# Patient Record
Sex: MALE | Race: OTHER | URBAN - METROPOLITAN AREA
[De-identification: names, ages, dates, MRNs, and addresses within clinical notes are randomized per-mention and may not be internally consistent; named-entity substitution may affect disease eponyms.]

---

## 2018-03-20 ENCOUNTER — IMPORTED ENCOUNTER (OUTPATIENT)
Dept: URBAN - METROPOLITAN AREA CLINIC 9 | Facility: CLINIC | Age: 73
End: 2018-03-20

## 2019-04-16 NOTE — PATIENT DISCUSSION
The patient was informed that with the Basic + option, they will most likely need prescription glasses at all focal points after surgery. The patient elects Basic + OD, goal of emmetropia.

## 2019-04-16 NOTE — PATIENT DISCUSSION
Patient educated that cataract surgery will not correct double vision and they will still require prism in glasses after surgery.

## 2019-04-24 NOTE — PATIENT DISCUSSION
The types of intraocular lenses were reviewed with the patient along with a discussion of their various strengths and weaknesses. Patient

## 2019-04-24 NOTE — PROCEDURE NOTE: SURGICAL
"<span style=""font-weight:bold;"">MR #:</span> [id number]<br /><br /><span style=""font-weight:bold;"">PREOPERATIVE DIAGNOSIS:</span> Cataract

## 2019-05-08 NOTE — PROCEDURE NOTE: SURGICAL
"<span style=""font-weight:bold;"">MR #:</span> [id number]<br /><br /><span style=""font-weight:bold;"">PREOPERATIVE DIAGNOSIS:</span> Cataract

## 2019-05-08 NOTE — PATIENT DISCUSSION
Cataract surgery has been performed in the first eye and activities of daily living are still impaired. The patient would like to proceed with cataract surgery in the second eye as scheduled. The patient elects Basic + OS, goal of emmetropia.

## 2019-05-16 NOTE — PATIENT DISCUSSION
1 WEEK PO: Patient is doing well post-operatively. The importance of post-op drop compliance was emphasized. Drop schedule reviewed with patient. Patient to call if any visual changes or concerns.

## 2019-07-15 NOTE — PATIENT DISCUSSION
PER KDS,  GOOD CANDIDATE FOR LASIK OS AND PRK OS.   [facility]

## 2019-07-15 NOTE — PATIENT DISCUSSION
"The patient is given the patient education document: ""[facility]""

## 2020-08-27 ENCOUNTER — IMPORTED ENCOUNTER (OUTPATIENT)
Dept: URBAN - METROPOLITAN AREA CLINIC 9 | Facility: CLINIC | Age: 75
End: 2020-08-27

## 2020-09-14 ENCOUNTER — IMPORTED ENCOUNTER (OUTPATIENT)
Dept: URBAN - METROPOLITAN AREA CLINIC 9 | Facility: CLINIC | Age: 75
End: 2020-09-14

## 2020-10-08 ENCOUNTER — IMPORTED ENCOUNTER (OUTPATIENT)
Dept: URBAN - METROPOLITAN AREA CLINIC 9 | Facility: CLINIC | Age: 75
End: 2020-10-08

## 2020-10-19 ENCOUNTER — IMPORTED ENCOUNTER (OUTPATIENT)
Dept: URBAN - METROPOLITAN AREA CLINIC 9 | Facility: CLINIC | Age: 75
End: 2020-10-19

## 2020-10-22 ENCOUNTER — IMPORTED ENCOUNTER (OUTPATIENT)
Dept: URBAN - METROPOLITAN AREA CLINIC 9 | Facility: CLINIC | Age: 75
End: 2020-10-22

## 2020-10-26 ENCOUNTER — IMPORTED ENCOUNTER (OUTPATIENT)
Dept: URBAN - METROPOLITAN AREA CLINIC 9 | Facility: CLINIC | Age: 75
End: 2020-10-26

## 2020-11-02 ENCOUNTER — IMPORTED ENCOUNTER (OUTPATIENT)
Dept: URBAN - METROPOLITAN AREA CLINIC 9 | Facility: CLINIC | Age: 75
End: 2020-11-02

## 2020-11-02 PROBLEM — Z96.1: Noted: 2020-11-02

## 2021-10-18 ASSESSMENT — VISUAL ACUITY
OD_SC: 20/25 SN
OS_SC: 20/100 SN
OD_CC: 20/20 SN
OS_SC: 20/400 SN
OD_SC: 20/400 SN
OD_SC: 20/25 - SN
OD_CC: 20/25 SN
OS_CC: 20/30 SN
OS_CC: 20/40 SN
OS_CC: 20/30 -2 SN
OD_CC: 20/20 SN
OD_CC: 20/20 - SN
OD_CC: 20/25 SN
OS_CC: 20/40 SN
OS_CC: 20/25 SN
OS_CC: 20/30 SN
OS_CC: 20/25 SN
OD_CC: 20/20 SN
OD_CC: 20/25 SN
OS_SC: 20/25 -2 SN
OS_CC: 20/40 SN
OS_SC: 20/40 -2 SN
OS_CC: 20/25 SN
OD_SC: 20/200 SN
OD_SC: 20/25 -2 SN
OD_CC: 20/25 SN
OS_SC: 20/30 SN

## 2021-10-18 ASSESSMENT — KERATOMETRY
OS_AXISANGLE2_DEGREES: 103
OD_AXISANGLE2_DEGREES: 101
OD_AXISANGLE_DEGREES: 11
OD_K1POWER_DIOPTERS: 42
OS_AXISANGLE_DEGREES: 13
OS_K1POWER_DIOPTERS: 42.5
OS_K2POWER_DIOPTERS: 42.5
OD_K2POWER_DIOPTERS: 40.75

## 2021-10-18 ASSESSMENT — TONOMETRY
OD_IOP_MMHG: 16
OD_IOP_MMHG: 10
OS_IOP_MMHG: 11
OS_IOP_MMHG: 11
OD_IOP_MMHG: 12
OD_IOP_MMHG: 12
OS_IOP_MMHG: 20
OD_IOP_MMHG: 13
OS_IOP_MMHG: 16
OS_IOP_MMHG: 15
OS_IOP_MMHG: 14
OD_IOP_MMHG: 15

## 2022-06-21 RX ORDER — OMEPRAZOLE 20 MG/1
1 CAPSULE, DELAYED RELEASE ORAL
COMMUNITY

## 2022-06-21 RX ORDER — NAPROXEN 250 MG/1
TABLET ORAL
COMMUNITY

## 2022-06-21 RX ORDER — ATORVASTATIN CALCIUM 40 MG/1
TABLET, FILM COATED ORAL
COMMUNITY

## 2022-06-21 RX ORDER — TAMSULOSIN HYDROCHLORIDE 0.4 MG/1
CAPSULE ORAL
COMMUNITY

## 2022-06-21 RX ORDER — SILDENAFIL 100 MG/1
TABLET, FILM COATED ORAL
COMMUNITY

## 2022-09-12 NOTE — PATIENT DISCUSSION
The patient feels that the cataract is significantly impacting daily activities and has elected cataract surgery. The risks, benefits, and alternatives to surgery were discussed. The patient elects to proceed with surgery. Cyclophosphamide Pregnancy And Lactation Text: This medication is Pregnancy Category D and it isn't considered safe during pregnancy. This medication is excreted in breast milk.

## 2022-09-14 ENCOUNTER — ESTABLISHED PATIENT (OUTPATIENT)
Dept: URBAN - METROPOLITAN AREA CLINIC 9 | Facility: CLINIC | Age: 77
End: 2022-09-14

## 2022-09-14 DIAGNOSIS — H43.812: ICD-10-CM

## 2022-09-14 DIAGNOSIS — H33.42: ICD-10-CM

## 2022-09-14 PROCEDURE — 92014 COMPRE OPH EXAM EST PT 1/>: CPT

## 2022-09-14 PROCEDURE — 92134 CPTRZ OPH DX IMG PST SGM RTA: CPT

## 2022-09-14 ASSESSMENT — TONOMETRY
OS_IOP_MMHG: 11
OD_IOP_MMHG: 11

## 2022-09-14 ASSESSMENT — VISUAL ACUITY
OD_SC: 20/25-1
OS_SC: CF 2FT

## 2023-09-11 NOTE — PATIENT DISCUSSION
Continue with post-operative drops until completed. 

## 2023-10-10 ENCOUNTER — ESTABLISHED PATIENT (OUTPATIENT)
Facility: LOCATION | Age: 78
End: 2023-10-10

## 2023-10-10 DIAGNOSIS — H43.812: ICD-10-CM

## 2023-10-10 DIAGNOSIS — H33.42: ICD-10-CM

## 2023-10-10 DIAGNOSIS — Z96.1: ICD-10-CM

## 2023-10-10 PROCEDURE — 92014 COMPRE OPH EXAM EST PT 1/>: CPT

## 2023-10-10 PROCEDURE — 92134 CPTRZ OPH DX IMG PST SGM RTA: CPT

## 2023-10-10 ASSESSMENT — VISUAL ACUITY
OS_SC: CF 3FT
OD_SC: 20/25+1

## 2023-10-10 ASSESSMENT — TONOMETRY
OS_IOP_MMHG: 8
OD_IOP_MMHG: 9
